# Patient Record
Sex: MALE | Race: WHITE | Employment: FULL TIME
[De-identification: names, ages, dates, MRNs, and addresses within clinical notes are randomized per-mention and may not be internally consistent; named-entity substitution may affect disease eponyms.]

---

## 2018-10-19 ENCOUNTER — HOSPITAL ENCOUNTER (OUTPATIENT)
Dept: MRI IMAGING | Facility: CLINIC | Age: 58
Discharge: HOME | End: 2018-10-21
Payer: COMMERCIAL

## 2018-10-19 DIAGNOSIS — S86.911S STRAIN OF RIGHT KNEE AND LEG, SEQUELA: ICD-10-CM

## 2018-10-19 PROCEDURE — 73721 MRI JNT OF LWR EXTRE W/O DYE: CPT

## 2023-08-31 ENCOUNTER — HOSPITAL ENCOUNTER (EMERGENCY)
Age: 63
Discharge: HOME | End: 2023-08-31
Payer: COMMERCIAL

## 2023-08-31 VITALS — OXYGEN SATURATION: 95 % | HEART RATE: 54 BPM | RESPIRATION RATE: 17 BRPM

## 2023-08-31 VITALS
HEART RATE: 62 BPM | TEMPERATURE: 98 F | DIASTOLIC BLOOD PRESSURE: 70 MMHG | RESPIRATION RATE: 20 BRPM | OXYGEN SATURATION: 95 % | SYSTOLIC BLOOD PRESSURE: 111 MMHG

## 2023-08-31 VITALS
HEART RATE: 57 BPM | SYSTOLIC BLOOD PRESSURE: 127 MMHG | OXYGEN SATURATION: 96 % | RESPIRATION RATE: 16 BRPM | DIASTOLIC BLOOD PRESSURE: 76 MMHG

## 2023-08-31 VITALS — OXYGEN SATURATION: 96 % | HEART RATE: 57 BPM | RESPIRATION RATE: 13 BRPM

## 2023-08-31 VITALS — RESPIRATION RATE: 17 BRPM | HEART RATE: 60 BPM

## 2023-08-31 VITALS — RESPIRATION RATE: 15 BRPM | HEART RATE: 52 BPM

## 2023-08-31 VITALS — HEART RATE: 48 BPM | RESPIRATION RATE: 20 BRPM | OXYGEN SATURATION: 96 %

## 2023-08-31 VITALS — RESPIRATION RATE: 19 BRPM | OXYGEN SATURATION: 97 % | HEART RATE: 53 BPM

## 2023-08-31 VITALS — BODY MASS INDEX: 26 KG/M2

## 2023-08-31 VITALS — OXYGEN SATURATION: 96 % | HEART RATE: 55 BPM | RESPIRATION RATE: 24 BRPM

## 2023-08-31 DIAGNOSIS — Z79.899: ICD-10-CM

## 2023-08-31 DIAGNOSIS — R10.9: Primary | ICD-10-CM

## 2023-08-31 LAB
ADD MANUAL DIFF: NO
ALANINE AMINOTRANSFERASE: 19 U/L (ref 16–63)
ALBUMIN GLOBULIN RATIO: 1.1
ALBUMIN LEVEL: 3.9 G/DL (ref 3.4–5)
ALKALINE PHOSPHATASE: 60 U/L (ref 46–116)
ANION GAP: 9.8
ASPARTATE AMINO TRANSFERASE: 16 U/L (ref 15–37)
BLOOD UREA NITROGEN: 10 MG/DL (ref 7–18)
CALCIUM: 8.6 MG/DL (ref 8.5–10.1)
CARBON DIOXIDE: 26.3 MMOL/L (ref 21–32)
CHLORIDE: 105 MMOL/L (ref 98–107)
CO2 BLD-SCNC: 26.3 MMOL/L (ref 21–32)
ESTIMATED GFR (AFRICAN AMERICA: >60 (ref 60–?)
ESTIMATED GFR (NON-AFRICAN AME: >60 (ref 60–?)
GLOBULIN: 3.4 G/DL
GLUCOSE BLD-MCNC: 93 MG/DL (ref 74–106)
HCT VFR BLD CALC: 45.1 % (ref 42–54)
HEMATOCRIT: 45.1 % (ref 42–54)
HEMOGLOBIN: 14.7 G/DL (ref 14–18)
IMMATURE GRANULOCYTES ABS AUTO: 0.03 10^3/UL (ref 0–0.03)
IMMATURE GRANULOCYTES PCT AUTO: 0.4 % (ref 0–0.5)
LACTATE/LACTIC ACID: 0.7 MMOL/L (ref 0.4–2)
LIPASE: 138 U/L (ref 73–393)
LYMPHOCYTES  ABSOLUTE AUTO: 2.7 10^3/UL (ref 1.2–3.8)
MCV RBC: 91.9 FL (ref 80–94)
MEAN CORPUSCULAR HEMOGLOBIN: 29.9 PG (ref 25.9–34)
MEAN CORPUSCULAR HGB CONC: 32.6 G/DL (ref 29.9–35.2)
MEAN CORPUSCULAR VOLUME: 91.9 FL (ref 80–94)
PLATELET # BLD: 345 10^3/UL (ref 150–450)
PLATELET COUNT: 345 10^3/UL (ref 150–450)
POTASSIUM SERPLBLD-SCNC: 4.1 MMOL/L (ref 3.5–5.1)
POTASSIUM: 4.1 MMOL/L (ref 3.5–5.1)
RED BLOOD COUNT: 4.91 10^6/UL (ref 4.7–6.1)
SODIUM BLD-SCNC: 137 MMOL/L (ref 136–145)
SODIUM: 137 MMOL/L (ref 136–145)
TOTAL PROTEIN: 7.3 G/DL (ref 6.4–8.2)
WBC # BLD: 7.5 10^3/UL (ref 4–11)
WHITE BLOOD COUNT: 7.5 10^3/UL (ref 4–11)

## 2023-08-31 PROCEDURE — 83690 ASSAY OF LIPASE: CPT

## 2023-08-31 PROCEDURE — 93005 ELECTROCARDIOGRAM TRACING: CPT

## 2023-08-31 PROCEDURE — 74022 RADEX COMPL AQT ABD SERIES: CPT

## 2023-08-31 PROCEDURE — 80053 COMPREHEN METABOLIC PANEL: CPT

## 2023-08-31 PROCEDURE — 99285 EMERGENCY DEPT VISIT HI MDM: CPT

## 2023-08-31 PROCEDURE — 85025 COMPLETE CBC W/AUTO DIFF WBC: CPT

## 2023-08-31 PROCEDURE — 36415 COLL VENOUS BLD VENIPUNCTURE: CPT

## 2023-08-31 PROCEDURE — 83605 ASSAY OF LACTIC ACID: CPT

## 2023-08-31 PROCEDURE — 84484 ASSAY OF TROPONIN QUANT: CPT

## 2023-08-31 NOTE — ED.ABDPAIN1
HPI - Abdominal Pain
General
Chief Complaint: Chest Pain
Stated Complaint: PAIN UP IN CHEST R SIDE-FELT LIKE BALL DROPPING
Time Seen by Provider: 08/31/23 13:17
Source: patient and family
Source comment: putting seatbelt on and felt sharp pain to the right lower rib area. then he felt a 'drop' and pain is now in the lower rib area and upper right abd. not sharp any more, just aching
Mode of arrival: walk-in
Limitations: no limitations
History of Present Illness
HPI narrative: 
patient is a 62-year-old male who presents to the emergency department for the evaluation of right upper quadrant pain radiating to the right side of the chest that he developed while putting his seatbelt over his right side earlier today. He states 
he had an intense pain in the right upper quadrant that radiated up to the right side of the chest but the intensity of the pain has subsided and now he reports only a mild aching in the right upper quadrant under the ribs. He denies charlene chest 
pain, shortness of breath, cough, congestion, fevers. He has had no vomiting or diarrhea. He denies any direct injury to the area.
Related Data
Home Medications

 Medication  Instructions  Recorded  Confirmed
albuterol sulfate 2.5 mg/3 mL mg 08/31/23 
(0.083 %) solution for nebulization   
amoxicillin 500 mg capsule mg 08/31/23 
gabapentin 300 mg capsule mg 08/31/23 


Allergies

Allergy/AdvReac Type Severity Reaction Status Date / Time
codeine Allergy Severe Muscle Pain Verified 08/31/23 13:25



Review of Systems
ROS  
 Constitutional Denies: fever or chills   
 Ears, nose, mouth, and throat Denies: throat pain   
 Cardiovascular Reports: chest pain   
 Respiratory Denies: shortness of breath or cough   
 Gastrointestinal Reports: abdominal pain; Denies: nausea or vomiting   
 Genitourinary Denies: painful urination   
 Musculoskeletal Denies: back pain   
 Neurological Denies: headache   

Exam
Narrative
Exam Narrative: 
Gen.: Awake, alert, in no distress
Head: Normocephalic, atraumatic
ENT: Moist mucous membranes
Respiratory: No respiratory distress, lungs clear bilaterally
Cardio: Regular rate and rhythm
Gastrointestinal: Abdomen is soft, nondistended and nontender to palpation
Extremities: Moves extremities equally
Psych: Normal mood and affect
Neuro: No focal neuro deficit
Skin: Warm, dry, intact
Constitutional
Vital Signs, click to edit/add: 

Last Vital Signs

Temp  98.0 F   08/31/23 13:15
Pulse  48 L  08/31/23 14:50
Resp  20   08/31/23 14:50
BP  127/76   08/31/23 14:32
Pulse Ox  96   08/31/23 14:50
O2 Del Method  Room Air  08/31/23 13:15




Course
Vital Signs
Vital signs: 

Vital Signs

Temperature  98.0 F   08/31/23 13:15
Pulse Rate  62   08/31/23 13:15
Respiratory Rate  20   08/31/23 13:15
Blood Pressure  111/70   08/31/23 13:15
Pulse Oximetry  95   08/31/23 13:15
Oxygen Delivery Method  Room Air  08/31/23 13:15



Temperature  98.0 F   08/31/23 13:15
Pulse Rate  48 L  08/31/23 14:50
Respiratory Rate  20   08/31/23 14:50
Blood Pressure  127/76   08/31/23 14:32
Pulse Oximetry  96   08/31/23 14:50
Oxygen Delivery Method  Room Air  08/31/23 13:15




MDM - Abdominal Pain
MDM Narrative
Medical decision making narrative: 
patient declined any medications for pain. His lab studies, EKG are all within normal limits. He was sent for abdominal x-rays and chest x-ray. This shows dilated bowel suggestive of a partial small bowel obstruction. I discussed these findings with 
the patient and his wife at bedside. Patient has no significant pain in the Emergency Room, he has had no vomiting. I strongly recommended that we send the patient back to CT for a CT of the abdomen and pelvis with IV and oral contrast to rule out a 
small bowel obstruction. The patient does not wish to have this testing done and would like to be discharged from the Emergency Room, he states he is feeling well and does not want to stay for any additional testing. I made him aware of the risks of 
not having a concrete diagnosis and not being able to rule out a bowel obstruction, he understands and would like to be discharged. He will follow a clear liquid diet, follow closely with his PCP. He verbalizes understanding that he should return to 
the emergency department if he develops worsening pain, vomiting, decreased bowel movements, fevers or any other new/worsening symptoms. He understands he can return to the emergency department at any time. His wife at bedside is in agreement with 
this treatment plan, she states she will encourage the patient to return if things are not improving at home.
Medical Records
Attestation: I reviewed the patient's medical records.
Lab Data
Attestation: I reviewed the patient's lab results.
Labs: 

Lab Results

  08/31/23 Range/Units
  14:05 
WBC  7.5  (4.0-11.0)  10^3/uL
RBC  4.91  (4.70-6.10)  10^6/uL
Hgb  14.7  (14.0-18.0)  g/dL
Hct  45.1  (42.0-54.0)  %
MCV  91.9  (80.0-94.0)  fL
MCH  29.9  (25.9-34.0)  pg
MCHC  32.6  (29.9-35.2)  g/dL
RDW  13.9  (11.0-15.0)  %
Plt Count  345  (150-450)  10^3/uL
MPV  8.3 L  (9.5-13.5)  fL
Neut % (Auto)  49.4  (43.0-75.0)  %
Lymph % (Auto)  36.4  (20.5-60.0)  %
Mono % (Auto)  9.8  (1.7-12.0)  %
Eos % (Auto)  3.3  (0.9-7.0)  %
Baso % (Auto)  0.7  (0.2-2.0)  %
Neut # (Auto)  3.7  (1.4-6.5)  10^3/uL
Lymph # (Auto)  2.7  (1.2-3.8)  10^3/uL
Mono # (Auto)  0.7  (0.3-0.8)  10^3/uL
Eos # (Auto)  0.3  (0.0-0.7)  10^3/uL
Baso # (Auto)  0.1  (0.0-0.1)  10^3/uL
Abs Immat Gran (auto)  0.03  (0.00-0.03)  10^3/uL
Imm/Tot Granulo (auto)  0.4  (0.0-0.5)  %
Sodium  137  (136-145)  mmol/L
Potassium  4.1  (3.5-5.1)  mmol/L
Chloride  105  ()  mmol/L
Carbon Dioxide  26.3  (21.0-32.0)  mmol/L
Anion Gap  9.8  
BUN  10.0  (7.0-18.0)  mg/dL
Creatinine  1.06  (0.70-1.30)  mg/dL
Est GFR ( Amer)  >60  (>=60)  
Est GFR (Non-Af Amer)  >60  (>=60)  
BUN/Creatinine Ratio  9.4  
Glucose  93  ()  mg/dL
Lactate  0.7  (0.4-2.0)  mmol/L
Calcium  8.6  (8.5-10.1)  mg/dL
Total Bilirubin  0.4  (0.2-1.0)  mg/dL
AST  16  (15-37)  U/L
ALT  19  (16-63)  U/L
Alkaline Phosphatase  60  ()  U/L
Troponin I High Sens  8.3  (4.0-76.1)  pg/mL
Total Protein  7.3  (6.4-8.2)  g/dL
Albumin  3.9  (3.4-5.0)  g/dL
Globulin  3.4  g/dL
Albumin/Globulin Ratio  1.1  
Lipase  138.0  (73.0-393.0)  U/L



Imaging Data
Abdominal x-ray: 
      Attestation: I have reviewed the pertinent imaging results.
      Radiologist's impression: 
Procedure: XR acute abdomen series
XR acute abdomen series, 8/31/2023 3:06 PM EDT, 
INDICATION: Abdominal pain
COMPARISON: Chest radiograph from 10/3/2022.
TECHNIQUE: Two views of the abdomen obtained. A single view of the chest.
FINDINGS:
The cardiomediastinal silhouette is within normal limits.
The lungs are clear.
There is no evidence of pneumothorax or pleural effusion.
Mild amount of gas is seen within several mildly dilated multiple bowel with a
few air-fluid levels. Gas and stool are seen within the colon.
No suspicious calcifications are projected over the kidneys, ureters or
bladder.
No pneumatosis or free air is seen.
The osseous and surrounding soft tissue structures appear within normal limits.
IMPRESSION:
Abnormal bowel gas pattern suggestive of partial small bowel obstruction.
No pneumatosis or free air is seen.
No acute pulmonary process is identified.
Electronically authenticated by: SHAHAB MARTEL Date: 8/31/2023 15:47
ECG Data
Attestation: I personally reviewed and interpreted this ECG as follows: (normal sinus rhythm at a rate of fifty-one, no acute ST elevation or ectopy. EKG reviewed by attending physician)
ECG interpretation date: 08/31/23
ECG interpretation time: 14:20

Discharge Plan
Discharge
Chief Complaint: Chest Pain

Clinical Impression:
 Abdominal pain


Patient Disposition: Home, Self-Care

Time of Disposition Decision: 16:07

Condition: Good

Prescriptions / Home Meds:
No Action
  amoxicillin 500 mg capsule 
       
  albuterol sulfate 2.5 mg /3 mL (0.083 %) solution for nebulization 
       
  gabapentin 300 mg capsule 
       

Instructions:  Abdominal Pain (ED), Bowel Obstruction (ED)

Additional Instructions:
If you have worsening pain, vomiting or fevers - you need to be evaluated, please return to the emergency room

Stand Alone Forms:  Portal Instructions

Referrals:
EVELYN GILMORE [Primary Care Provider] - 1 week

## 2023-08-31 NOTE — ECG_ITS
[facility] 
                                        
                                       Test Date:    2023 
Pat Name:     KILO ZAMBRANO          Department:    
Patient ID:   [id number]               Room:         - 
Gender:       Male                     Technician:    
:          [id number]               Requested By: [id number] 
Order Number: [id number]              Reading MD:   DEREK VANEGAS 
                                 Measurements 
Intervals                              Axis           
Rate:         51                       P:            54 
NE:           156                      QRS:          64 
QRSD:         94                       T:            11 
QT:           426                                     
QTc:          404                                     
                           Interpretive Statements 
1100 Sinus bradycardia 
9110 **  normal ECG  ** 
No previous ECG available for comparison 
Electronically Signed On 2023 7:24:15 EDT by DEREK VANEGAS

## 2023-08-31 NOTE — ED_ITS
HPI - Abdominal Pain    
General    
Chief Complaint: Chest Pain    
Stated Complaint: PAIN UP IN CHEST R SIDE-FELT LIKE BALL DROPPING    
Time Seen by Provider: 08/31/23 13:17    
Source: patient and family    
Source comment: putting seatbelt on and felt sharp pain to the right lower rib   
area. then he felt a 'drop' and pain is now in the lower rib area and upper   
right abd. not sharp any more, just aching    
Mode of arrival: walk-in    
Limitations: no limitations    
History of Present Illness    
HPI narrative:     
patient is a 62-year-old male who presents to the emergency department for the   
evaluation of right upper quadrant pain radiating to the right side of the chest  
that he developed while putting his seatbelt over his right side earlier today.   
He states he had an intense pain in the right upper quadrant that radiated up to  
the right side of the chest but the intensity of the pain has subsided and now   
he reports only a mild aching in the right upper quadrant under the ribs. He   
denies charlene chest pain, shortness of breath, cough, congestion, fevers. He has   
had no vomiting or diarrhea. He denies any direct injury to the area.    
Related Data    
                                Home Medications    
    
    
    
 Medication  Instructions  Recorded  Confirmed    
     
albuterol sulfate 2.5 mg/3 mL mg 08/31/23     
    
(0.083 %) solution for nebulization       
     
amoxicillin 500 mg capsule mg 08/31/23     
     
gabapentin 300 mg capsule mg 08/31/23     
    
    
    
                                    Allergies    
    
    
    
Allergy/AdvReac Type Severity Reaction Status Date / Time    
     
codeine Allergy Severe Muscle Pain Verified 08/31/23 13:25    
    
    
    
    
Review of Systems    
    
    
ROS      
    
 Constitutional Denies: fever or chills       
    
 Ears, nose, mouth, and throat Denies: throat pain       
    
 Cardiovascular Reports: chest pain       
    
 Respiratory Denies: shortness of breath or cough       
    
 Gastrointestinal Reports: abdominal pain; Denies: nausea or vomiting       
    
 Genitourinary Denies: painful urination       
    
 Musculoskeletal Denies: back pain       
    
 Neurological Denies: headache       
    
    
Exam    
Narrative    
Exam Narrative:     
Gen.: Awake, alert, in no distress    
Head: Normocephalic, atraumatic    
ENT: Moist mucous membranes    
Respiratory: No respiratory distress, lungs clear bilaterally    
Cardio: Regular rate and rhythm    
Gastrointestinal: Abdomen is soft, nondistended and nontender to palpation    
Extremities: Moves extremities equally    
Psych: Normal mood and affect    
Neuro: No focal neuro deficit    
Skin: Warm, dry, intact    
Constitutional    
Vital Signs, click to edit/add:     
    
                                Last Vital Signs    
    
    
    
Temp  98.0 F   08/31/23 13:15    
     
Pulse  48 L  08/31/23 14:50    
     
Resp  20   08/31/23 14:50    
     
BP  127/76   08/31/23 14:32    
     
Pulse Ox  96   08/31/23 14:50    
     
O2 Del Method  Room Air  08/31/23 13:15    
    
    
    
    
    
Course    
Vital Signs    
Vital signs:     
    
                                   Vital Signs    
    
    
    
Temperature  98.0 F   08/31/23 13:15    
     
Pulse Rate  62   08/31/23 13:15    
     
Respiratory Rate  20   08/31/23 13:15    
     
Blood Pressure  111/70   08/31/23 13:15    
     
Pulse Oximetry  95   08/31/23 13:15    
     
Oxygen Delivery Method  Room Air  08/31/23 13:15    
    
    
                                            
    
    
    
Temperature  98.0 F   08/31/23 13:15    
     
Pulse Rate  48 L  08/31/23 14:50    
     
Respiratory Rate  20   08/31/23 14:50    
     
Blood Pressure  127/76   08/31/23 14:32    
     
Pulse Oximetry  96   08/31/23 14:50    
     
Oxygen Delivery Method  Room Air  08/31/23 13:15    
    
    
    
    
    
MDM - Abdominal Pain    
MDM Narrative    
Medical decision making narrative:     
patient declined any medications for pain. His lab studies, EKG are all within   
normal limits. He was sent for abdominal x-rays and chest x-ray. This shows   
dilated bowel suggestive of a partial small bowel obstruction. I discussed these  
findings with the patient and his wife at bedside. Patient has no significant   
pain in the Emergency Room, he has had no vomiting. I strongly recommended that   
we send the patient back to CT for a CT of the abdomen and pelvis with IV and   
oral contrast to rule out a small bowel obstruction. The patient does not wish   
to have this testing done and would like to be discharged from the Emergency   
Room, he states he is feeling well and does not want to stay for any additional   
testing. I made him aware of the risks of not having a concrete diagnosis and   
not being able to rule out a bowel obstruction, he understands and would like to  
be discharged. He will follow a clear liquid diet, follow closely with his PCP.   
He verbalizes understanding that he should return to the emergency department if  
he develops worsening pain, vomiting, decreased bowel movements, fevers or any   
other new/worsening symptoms. He understands he can return to the emergency   
department at any time. His wife at bedside is in agreement with this treatment   
plan, she states she will encourage the patient to return if things are not   
improving at home.    
Medical Records    
Attestation: I reviewed the patient's medical records.    
Lab Data    
Attestation: I reviewed the patient's lab results.    
Labs:     
    
                                   Lab Results    
    
    
    
  08/31/23 Range/Units    
    
  14:05     
     
WBC  7.5  (4.0-11.0)  10^3/uL    
     
RBC  4.91  (4.70-6.10)  10^6/uL    
     
Hgb  14.7  (14.0-18.0)  g/dL    
     
Hct  45.1  (42.0-54.0)  %    
     
MCV  91.9  (80.0-94.0)  fL    
     
MCH  29.9  (25.9-34.0)  pg    
     
MCHC  32.6  (29.9-35.2)  g/dL    
     
RDW  13.9  (11.0-15.0)  %    
     
Plt Count  345  (150-450)  10^3/uL    
     
MPV  8.3 L  (9.5-13.5)  fL    
     
Neut % (Auto)  49.4  (43.0-75.0)  %    
     
Lymph % (Auto)  36.4  (20.5-60.0)  %    
     
Mono % (Auto)  9.8  (1.7-12.0)  %    
     
Eos % (Auto)  3.3  (0.9-7.0)  %    
     
Baso % (Auto)  0.7  (0.2-2.0)  %    
     
Neut # (Auto)  3.7  (1.4-6.5)  10^3/uL    
     
Lymph # (Auto)  2.7  (1.2-3.8)  10^3/uL    
     
Mono # (Auto)  0.7  (0.3-0.8)  10^3/uL    
     
Eos # (Auto)  0.3  (0.0-0.7)  10^3/uL    
     
Baso # (Auto)  0.1  (0.0-0.1)  10^3/uL    
     
Abs Immat Gran (auto)  0.03  (0.00-0.03)  10^3/uL    
     
Imm/Tot Granulo (auto)  0.4  (0.0-0.5)  %    
     
Sodium  137  (136-145)  mmol/L    
     
Potassium  4.1  (3.5-5.1)  mmol/L    
     
Chloride  105  ()  mmol/L    
     
Carbon Dioxide  26.3  (21.0-32.0)  mmol/L    
     
Anion Gap  9.8      
     
BUN  10.0  (7.0-18.0)  mg/dL    
     
Creatinine  1.06  (0.70-1.30)  mg/dL    
     
Est GFR ( Amer)  >60  (>=60)      
     
Est GFR (Non-Af Amer)  >60  (>=60)      
     
BUN/Creatinine Ratio  9.4      
     
Glucose  93  ()  mg/dL    
     
Lactate  0.7  (0.4-2.0)  mmol/L    
     
Calcium  8.6  (8.5-10.1)  mg/dL    
     
Total Bilirubin  0.4  (0.2-1.0)  mg/dL    
     
AST  16  (15-37)  U/L    
     
ALT  19  (16-63)  U/L    
     
Alkaline Phosphatase  60  ()  U/L    
     
Troponin I High Sens  8.3  (4.0-76.1)  pg/mL    
     
Total Protein  7.3  (6.4-8.2)  g/dL    
     
Albumin  3.9  (3.4-5.0)  g/dL    
     
Globulin  3.4  g/dL    
     
Albumin/Globulin Ratio  1.1      
     
Lipase  138.0  (73.0-393.0)  U/L    
    
    
    
    
Imaging Data    
Abdominal x-ray:     
      Attestation: I have reviewed the pertinent imaging results.    
      Radiologist's impression:     
Procedure: XR acute abdomen series    
XR acute abdomen series, 8/31/2023 3:06 PM EDT,     
INDICATION: Abdominal pain    
COMPARISON: Chest radiograph from 10/3/2022.    
TECHNIQUE: Two views of the abdomen obtained. A single view of the chest.    
FINDINGS:    
The cardiomediastinal silhouette is within normal limits.    
The lungs are clear.    
There is no evidence of pneumothorax or pleural effusion.    
Mild amount of gas is seen within several mildly dilated multiple bowel with a    
few air-fluid levels. Gas and stool are seen within the colon.    
No suspicious calcifications are projected over the kidneys, ureters or    
bladder.    
No pneumatosis or free air is seen.    
The osseous and surrounding soft tissue structures appear within normal limits.    
IMPRESSION:    
Abnormal bowel gas pattern suggestive of partial small bowel obstruction.    
No pneumatosis or free air is seen.    
No acute pulmonary process is identified.    
Electronically authenticated by: SHAHAB MARTEL Date: 8/31/2023 15:47    
ECG Data    
Attestation: I personally reviewed and interpreted this ECG as follows: (normal   
sinus rhythm at a rate of fifty-one, no acute ST elevation or ectopy. EKG   
reviewed by attending physician)    
ECG interpretation date: 08/31/23    
ECG interpretation time: 14:20    
    
Discharge Plan    
Discharge    
Chief Complaint: Chest Pain    
    
Clinical Impression:    
 Abdominal pain    
    
    
Patient Disposition: Home, Self-Care    
    
Time of Disposition Decision: 16:07    
    
Condition: Good    
    
Prescriptions / Home Meds:    
No Action    
  amoxicillin 500 mg capsule     
           
  albuterol sulfate 2.5 mg /3 mL (0.083 %) solution for nebulization     
           
  gabapentin 300 mg capsule     
           
    
Instructions:  Abdominal Pain (ED), Bowel Obstruction (ED)    
    
Additional Instructions:    
If you have worsening pain, vomiting or fevers - you need to be evaluated, ple  
ase return to the emergency room    
    
Stand Alone Forms:  Portal Instructions    
    
Referrals:    
EVELYN GILMORE [Primary Care Provider] - 1 week

## 2023-08-31 NOTE — XR_ITS
The [address] [address] 
     [phone] 
  
  
Patient Name: 
KILO ZAMBRANO 
  
MRN: [id number]    YOB: 1960    Sex: M 
Assigned Patient Location: ER 
Current Patient Location: ER 
Accession/Order Number: [id number] 
Exam Date: 8/31/2023  15:06    Report Date: 8/31/2023  15:47 
  
At the request of: 
LINDA BRIONES   
  
Procedure:  XR acute abdomen series 
  
XR acute abdomen series, 8/31/2023 3:06 PM EDT,  
  
INDICATION: Abdominal pain 
  
COMPARISON: Chest radiograph from 10/3/2022. 
  
TECHNIQUE: Two views of the abdomen obtained. A single view of the chest. 
  
FINDINGS: 
  
The cardiomediastinal silhouette is within normal limits.  
The lungs are clear.  
There is no evidence of pneumothorax or pleural effusion. 
  
Mild amount of gas is seen within several mildly dilated multiple bowel with a  
  
few air-fluid levels. Gas and stool are seen within the colon. 
No suspicious calcifications are projected over the kidneys, ureters or  
bladder. 
No pneumatosis or free air is seen. 
The osseous and surrounding soft tissue structures appear within normal  
limits. 
  
ORDER #: [id number] XR/XR acute abdomen series  
IMPRESSION:  
Abnormal bowel gas pattern suggestive of partial small bowel obstruction.  
   
No pneumatosis or free air is seen.  
   
No acute pulmonary process is identified.  
   
   
Electronically authenticated by: SHAHAB MARTEL   Date: 8/31/2023  15:47